# Patient Record
Sex: MALE | Race: AMERICAN INDIAN OR ALASKA NATIVE | HISPANIC OR LATINO | Employment: FULL TIME | URBAN - METROPOLITAN AREA
[De-identification: names, ages, dates, MRNs, and addresses within clinical notes are randomized per-mention and may not be internally consistent; named-entity substitution may affect disease eponyms.]

---

## 2024-07-26 ENCOUNTER — OFFICE VISIT (OUTPATIENT)
Dept: FAMILY MEDICINE CLINIC | Facility: CLINIC | Age: 24
End: 2024-07-26
Payer: COMMERCIAL

## 2024-07-26 VITALS
HEIGHT: 67 IN | TEMPERATURE: 98.2 F | HEART RATE: 65 BPM | BODY MASS INDEX: 30.13 KG/M2 | DIASTOLIC BLOOD PRESSURE: 71 MMHG | OXYGEN SATURATION: 99 % | WEIGHT: 192 LBS | SYSTOLIC BLOOD PRESSURE: 137 MMHG

## 2024-07-26 DIAGNOSIS — E66.9 OBESITY (BMI 30.0-34.9): ICD-10-CM

## 2024-07-26 DIAGNOSIS — E78.5 HYPERLIPIDEMIA, UNSPECIFIED HYPERLIPIDEMIA TYPE: ICD-10-CM

## 2024-07-26 DIAGNOSIS — R73.09 HIGH GLUCOSE: Primary | ICD-10-CM

## 2024-07-26 DIAGNOSIS — I10 HYPERTENSION, UNSPECIFIED TYPE: ICD-10-CM

## 2024-07-26 LAB
ALBUMIN SERPL-MCNC: 4.8 G/DL (ref 3.5–5.2)
ALBUMIN/GLOB SERPL: 1.5 G/DL
ALP SERPL-CCNC: 77 U/L (ref 39–117)
ALT SERPL W P-5'-P-CCNC: 24 U/L (ref 1–41)
ANION GAP SERPL CALCULATED.3IONS-SCNC: 13.5 MMOL/L (ref 5–15)
AST SERPL-CCNC: 24 U/L (ref 1–40)
BASOPHILS # BLD AUTO: 0.04 10*3/MM3 (ref 0–0.2)
BASOPHILS NFR BLD AUTO: 0.4 % (ref 0–1.5)
BILIRUB SERPL-MCNC: 0.3 MG/DL (ref 0–1.2)
BUN SERPL-MCNC: 9 MG/DL (ref 6–20)
BUN/CREAT SERPL: 9.9 (ref 7–25)
CALCIUM SPEC-SCNC: 10 MG/DL (ref 8.6–10.5)
CHLORIDE SERPL-SCNC: 102 MMOL/L (ref 98–107)
CHOLEST SERPL-MCNC: 211 MG/DL (ref 0–200)
CO2 SERPL-SCNC: 22.5 MMOL/L (ref 22–29)
CREAT SERPL-MCNC: 0.91 MG/DL (ref 0.76–1.27)
DEPRECATED RDW RBC AUTO: 40 FL (ref 37–54)
EGFRCR SERPLBLD CKD-EPI 2021: 122.2 ML/MIN/1.73
EOSINOPHIL # BLD AUTO: 0.13 10*3/MM3 (ref 0–0.4)
EOSINOPHIL NFR BLD AUTO: 1.4 % (ref 0.3–6.2)
ERYTHROCYTE [DISTWIDTH] IN BLOOD BY AUTOMATED COUNT: 13.6 % (ref 12.3–15.4)
GLOBULIN UR ELPH-MCNC: 3.1 GM/DL
GLUCOSE SERPL-MCNC: 80 MG/DL (ref 65–99)
HBA1C MFR BLD: 5.6 % (ref 4.8–5.6)
HCT VFR BLD AUTO: 43.9 % (ref 37.5–51)
HDLC SERPL-MCNC: 46 MG/DL (ref 40–60)
HGB BLD-MCNC: 14.6 G/DL (ref 13–17.7)
IMM GRANULOCYTES # BLD AUTO: 0.02 10*3/MM3 (ref 0–0.05)
IMM GRANULOCYTES NFR BLD AUTO: 0.2 % (ref 0–0.5)
LDLC SERPL CALC-MCNC: 153 MG/DL (ref 0–100)
LDLC/HDLC SERPL: 3.3 {RATIO}
LYMPHOCYTES # BLD AUTO: 2.5 10*3/MM3 (ref 0.7–3.1)
LYMPHOCYTES NFR BLD AUTO: 26.8 % (ref 19.6–45.3)
MCH RBC QN AUTO: 27.4 PG (ref 26.6–33)
MCHC RBC AUTO-ENTMCNC: 33.3 G/DL (ref 31.5–35.7)
MCV RBC AUTO: 82.4 FL (ref 79–97)
MONOCYTES # BLD AUTO: 0.67 10*3/MM3 (ref 0.1–0.9)
MONOCYTES NFR BLD AUTO: 7.2 % (ref 5–12)
NEUTROPHILS NFR BLD AUTO: 5.98 10*3/MM3 (ref 1.7–7)
NEUTROPHILS NFR BLD AUTO: 64 % (ref 42.7–76)
NRBC BLD AUTO-RTO: 0 /100 WBC (ref 0–0.2)
PLATELET # BLD AUTO: 303 10*3/MM3 (ref 140–450)
PMV BLD AUTO: 9.6 FL (ref 6–12)
POTASSIUM SERPL-SCNC: 4.1 MMOL/L (ref 3.5–5.2)
PROT SERPL-MCNC: 7.9 G/DL (ref 6–8.5)
RBC # BLD AUTO: 5.33 10*6/MM3 (ref 4.14–5.8)
SODIUM SERPL-SCNC: 138 MMOL/L (ref 136–145)
TRIGL SERPL-MCNC: 67 MG/DL (ref 0–150)
TSH SERPL DL<=0.05 MIU/L-ACNC: 1.73 UIU/ML (ref 0.27–4.2)
VLDLC SERPL-MCNC: 12 MG/DL (ref 5–40)
WBC NRBC COR # BLD AUTO: 9.34 10*3/MM3 (ref 3.4–10.8)

## 2024-07-26 PROCEDURE — 83036 HEMOGLOBIN GLYCOSYLATED A1C: CPT | Performed by: STUDENT IN AN ORGANIZED HEALTH CARE EDUCATION/TRAINING PROGRAM

## 2024-07-26 PROCEDURE — 84443 ASSAY THYROID STIM HORMONE: CPT | Performed by: STUDENT IN AN ORGANIZED HEALTH CARE EDUCATION/TRAINING PROGRAM

## 2024-07-26 PROCEDURE — 85025 COMPLETE CBC W/AUTO DIFF WBC: CPT | Performed by: STUDENT IN AN ORGANIZED HEALTH CARE EDUCATION/TRAINING PROGRAM

## 2024-07-26 PROCEDURE — 80061 LIPID PANEL: CPT | Performed by: STUDENT IN AN ORGANIZED HEALTH CARE EDUCATION/TRAINING PROGRAM

## 2024-07-26 PROCEDURE — 80053 COMPREHEN METABOLIC PANEL: CPT | Performed by: STUDENT IN AN ORGANIZED HEALTH CARE EDUCATION/TRAINING PROGRAM

## 2024-07-26 NOTE — ASSESSMENT & PLAN NOTE
Patient's (Body mass index is 30.07 kg/m².) indicates that they are obese (BMI >30) with health conditions that include hypertension . Weight is newly identified. BMI  is above average; BMI management plan is completed. We discussed portion control and increasing exercise.

## 2024-07-26 NOTE — PROGRESS NOTES
"Chief Complaint  Establish Care (New pt ) and Employment Physical (For work )    Subjective      Tommy Campbell is a 23 y.o. male who presents to [facility]     New patient:    Patient refers feeling good. BP is borderline.     Drinks alcohol 24 beers per week.   Counseling given.       Tommy Campbell  reports that he has been smoking cigarettes. He started smoking about 6 years ago. He has a 1.6 pack-year smoking history. He has never used smokeless tobacco. I have educated him on the risk of diseases from using tobacco products such as cancer, COPD, and heart disease.     I advised him to quit and he is willing to quit. We have discussed the following method/s for tobacco cessation:  Education Material and Counseling.  Together we have set a quit date for 1 month from today.  He will follow up with me in 2 month or sooner to check on his progress.    I spent 4 minutes counseling the patient.          Objective   Vital Signs:   Vitals:    07/26/24 1404   BP: 137/71   Pulse: 65   Temp: 98.2 °F (36.8 °C)   TempSrc: Temporal   SpO2: 99%   Weight: 87.1 kg (192 lb)   Height: 170.2 cm (67\")     Body mass index is 30.07 kg/m².    Wt Readings from Last 3 Encounters:   07/26/24 87.1 kg (192 lb)     BP Readings from Last 3 Encounters:   07/26/24 137/71       Health Maintenance   Topic Date Due    LIPID PANEL  Never done    TDAP/TD VACCINES (1 - Tdap) Never done    HEPATITIS C SCREENING  Never done    ANNUAL PHYSICAL  Never done    COVID-19 Vaccine (1 - 2023-24 season) 10/15/2024 (Originally 9/1/2023)    INFLUENZA VACCINE  08/01/2024    BMI FOLLOWUP  07/26/2025    Pneumococcal Vaccine 0-64  Aged Out    MENINGOCOCCAL VACCINE  Aged Out       Physical Exam  Vitals reviewed.   HENT:      Head: Normocephalic.      Mouth/Throat:      Mouth: Mucous membranes are moist.   Eyes:      Pupils: Pupils are equal, round, and reactive to light.   Cardiovascular:      Rate and Rhythm: Normal rate. "   Abdominal:      General: Abdomen is flat.   Musculoskeletal:         General: Normal range of motion.      Cervical back: Normal range of motion.   Skin:     General: Skin is warm.      Capillary Refill: Capillary refill takes less than 2 seconds.   Neurological:      Mental Status: He is alert.          Assessment & Plan  High glucose  A1c   Hyperlipidemia, unspecified hyperlipidemia type   Lipid abnormalities are stable    Plan:  Continue same medication/s without change.      Discussed medication dosage, use, side effects, and goals of treatment in detail.    Counseled patient on lifestyle modifications to help control hyperlipidemia.     Patient Treatment Goals:   LDL goal is less than 70    Followup in 6 months.  Obesity (BMI 30.0-34.9)  Patient's (Body mass index is 30.07 kg/m².) indicates that they are obese (BMI >30) with health conditions that include hypertension . Weight is newly identified. BMI  is above average; BMI management plan is completed. We discussed portion control and increasing exercise.   Hypertension, unspecified type  Hypertension is stable and controlled  Continue current treatment regimen.  Blood pressure will be reassessed in 6 months.    Orders Placed This Encounter   Procedures    Hemoglobin A1c    TSH    Comprehensive Metabolic Panel    Lipid Panel    CBC & Differential                     I spent 24 minutes caring for Tommy on this date of service. This time includes time spent by me in the following activities:preparing for the visit, reviewing tests, obtaining and/or reviewing a separately obtained history, performing a medically appropriate examination and/or evaluation , counseling and educating the patient/family/caregiver, ordering medications, tests, or procedures, referring and communicating with other health care professionals , documenting information in the medical record, independently interpreting results and communicating that information with the  patient/family/caregiver, and care coordination  FOLLOW UP  Return in about 1 year (around 7/26/2025).  Patient was given instructions and counseling regarding his condition or for health maintenance advice. Please see specific information pulled into the AVS if appropriate.       Mike De Jesus MD  07/26/24  14:26 EDT    CURRENT & DISCONTINUED MEDICATIONS  No current outpatient medications    There are no discontinued medications.